# Patient Record
Sex: FEMALE | Race: BLACK OR AFRICAN AMERICAN | NOT HISPANIC OR LATINO | Employment: OTHER | URBAN - METROPOLITAN AREA
[De-identification: names, ages, dates, MRNs, and addresses within clinical notes are randomized per-mention and may not be internally consistent; named-entity substitution may affect disease eponyms.]

---

## 2023-02-17 DIAGNOSIS — I71.23 ANEURYSM OF DESCENDING THORACIC AORTA, UNSPECIFIED WHETHER RUPTURED: Primary | ICD-10-CM

## 2023-03-03 ENCOUNTER — HOSPITAL ENCOUNTER (OUTPATIENT)
Dept: RADIOLOGY | Facility: HOSPITAL | Age: 68
Discharge: HOME | End: 2023-03-03
Attending: SURGERY
Payer: MEDICARE

## 2023-03-03 DIAGNOSIS — I71.23 ANEURYSM OF DESCENDING THORACIC AORTA, UNSPECIFIED WHETHER RUPTURED: ICD-10-CM

## 2023-03-03 LAB
CREAT SERPL-MCNC: 1.1 MG/DL (ref 0.5–1.4)
SAMPLE: NORMAL

## 2023-03-03 PROCEDURE — 74174 CTA ABD&PLVS W/CONTRAST: CPT | Mod: TC,PO

## 2023-03-03 PROCEDURE — 25500020 PHARM REV CODE 255: Mod: PO | Performed by: SURGERY

## 2023-03-03 PROCEDURE — 71275 CT ANGIOGRAPHY CHEST: CPT | Mod: TC,PO

## 2023-03-03 RX ADMIN — IOHEXOL 100 ML: 350 INJECTION, SOLUTION INTRAVENOUS at 01:03

## 2024-04-15 ENCOUNTER — TELEPHONE (OUTPATIENT)
Dept: HEMATOLOGY/ONCOLOGY | Facility: CLINIC | Age: 69
End: 2024-04-15
Payer: MEDICARE

## 2024-04-15 NOTE — NURSING
Chart review. Pt scheduled via [facility] according to the appointment with Dr. Coe. No referral in the system, called the patient who states that someone from [facility] called her to schedule the appointment. The appointment states referral for Hemorrhoids which there is no referral, pt states it's for pancreatic insufficiency.  Messaged Dr. Coe' nurse Kylie for appropriate scheduling as Dr. Coe does not follow pancreatic insuffiencey, pt may need EUS. Will defer to Kylie for appropriate scheduling.

## 2024-04-16 ENCOUNTER — TELEPHONE (OUTPATIENT)
Dept: SURGERY | Facility: CLINIC | Age: 69
End: 2024-04-16
Payer: MEDICARE

## 2024-04-16 NOTE — TELEPHONE ENCOUNTER
Over the last year patient has had urinary incontinence and more recently fecal incontinence.  Has been tested for InterStim for urinary problems, but fecal incontinence was not issue at the time. InterStim did not correct urinary problem enough to warrant use per patient. October/November 2023 fecal incontinence began, so much so she had to change her clothes. Diet and medication change has helped to the point patient does not need to change clothes as often, but is still requiring the use of a pad.  States that she had Pelvic Floor Therapy (PFT) at [facility] in [place], [place] towards the end of March/early April, but it did not relieve the issue. A recent CT scan and stool sample showed pancreas is not breaking down the food as it should. Patient sees Dr Hallman (urology) and NP is Mis Badillo. Will discuss with Dr Coe' nurse to see if Dr Coe sees fecal incontinence and return call to patient tomorrow. Informed patient, that if Dr Coe does not, we will help get her scheduled with an appropriate provider. Will request PFT,CT and stool sample reports.

## 2024-04-17 ENCOUNTER — TELEPHONE (OUTPATIENT)
Dept: SURGERY | Facility: CLINIC | Age: 69
End: 2024-04-17
Payer: MEDICARE

## 2024-04-17 NOTE — TELEPHONE ENCOUNTER
Spoke with patient. Informed her that Dr Coe does not see fecal incontinence, discussed patient with Dr Dickinson's nurse. Patient to expect a call Tuesday for evaluation for appt needs. Patient verbalized  understanding.

## 2024-04-17 NOTE — TELEPHONE ENCOUNTER
----- Message from Celeste Phillip RN sent at 4/16/2024  1:01 PM CDT -----    ----- Message -----  From: Celeste Phillip RN  Sent: 4/16/2024   1:01 PM CDT  To: HOLLAND Sanchez Dr? Or Teena? Request records.

## 2024-04-17 NOTE — TELEPHONE ENCOUNTER
Faxed LALO to [facility]  in [facility], [place] fax: [phone]  ( [phone]; release office phone number is [phone]).

## 2024-04-23 ENCOUNTER — TELEPHONE (OUTPATIENT)
Dept: SURGERY | Facility: CLINIC | Age: 69
End: 2024-04-23
Payer: MEDICARE

## 2024-04-23 NOTE — TELEPHONE ENCOUNTER
"Placed call to schedule appt with [facility].  Dx: rUTIs, persistent UUI, fecal incontinence & urgency   Ref. Provider: Felipe Mena MD    InterStim testing performed 23 for fecal incontinence; Declined placement at that time - pt states "results weren't high enough to warrant implantation" to which she agreed with the MD at that time; however, fecal incontinence is significantly worse now since approx Oct- 2023. She states these symptoms impact her life on a daily basis.    GI recommended Manometry or Defo - pt denies/does not recall having these testing recommendations done.     PFPT- started in 2024-2024. Records from [facility] in [place], [place] w/ Candie, PT requested per Cat, CRS RN Navigator (See previous notes).    Takes Creon (TID w/ meals) & fiber daily w/ some benefit. Occasionally takes Imodium.   Feels she is unable to fully empty colon & continues to experience fecal smearing.    Additionally, pt has 3 children born; 1st two were , last via  section d/t fetal positioning.     Mrs. Bates states she is seeing Dr. Mena today and will request that her medical records be faxed to our department. Fax number ([phone]) was provided along with my direct work number for future use.    Explained Pelvic Floor Clinic & its benefits. Offered appt date & time in [facility] to which patient provided verbal approval. Appt was booked at the time of call.  Briefly reviewed location details. Will place appt slip reminder in mail. Pt also states she is active in the portal.   All questions were addressed & answered to the patient's satisfaction.    No additional needs were identified upon completion of call.   "

## 2024-04-29 ENCOUNTER — TELEPHONE (OUTPATIENT)
Dept: SURGERY | Facility: CLINIC | Age: 69
End: 2024-04-29
Payer: MEDICARE

## 2024-05-14 NOTE — PROGRESS NOTES
CRS Office Visit History and Physical    Referring Md:   Felipe Mena Md  [address] [place] [address]    SUBJECTIVE:     Chief Complaint: Fecal incontinence, diarrhea    History of Present Illness:  The patient is new patient to this practice.   Course is as follows:  Patient is a 69 y.o. female w/ pancreatic insufficiency presents with fecal incontinence.  Symptoms have been present for 9 months.  Follows w/ Dr Mena (Gastro, [place]). On Creon, which is helping with formed stool. Has not tried Colestipol.  Tried Imodium, helped for 1 week after the dose.  Had an Interstim trial 1 year ago for urine symptoms, did not get implant b/c of inadequate effect (this was prior to fecal incontinence starting)  Tried PFPT  Last Colonoscopy: July 2022 (Bx negative for microscopic colitis, 5mm adenoma x 2)    Prior abdominal surgery: Yes - cherry, abdominal mass resection, KARYN/BSO, sleeve gastrectomy  Prior pelvic or anorectal surgery: No  Family history of colon/rectal cancer: No  Family history of IBD: No  Steroid or other immunosuppression: No  Blood thinners: No  Current stool softeners or fiber supplement: Yes - Metamucil BID (+ nausea)  Active smoking: No  Prior deliveries: Yes - 3  complicated by tear: No    [place] (FI):  Leakage of solid stool:     Daily (4)  Leakage of liquid stool:   Daily (4)  Leakage of flatus:       Daily (4)  Wear a pad:     Daily (4)  Alter life:       Usually (3)         Total: 19    Altomare (ODS):  How long on toilet:   <5min (0)   How many times/day: 5-6 (3)    Digitation:   Never (0)   Laxatives:   Never (0)   Enemas:    Never (0)   Incomplete stool: Weekly (2)   Strain:    Never (0)   Total: 5    Stool consistency/[place]: 5  Bowel movements per month:  Daily   Leakage of urine: Yes   Trouble emptying your bladder: Yes   Feel something bulging through vagina? No        Review of patient's allergies indicates:   Allergen Reactions     "Latex, natural rubber        Past Medical History:   Diagnosis Date    Loose skin      Past Surgical History:   Procedure Laterality Date    ANKLE LIGAMENT RECONSTRUCTION Left     CHOLECYSTECTOMY      COLONOSCOPY  07/2022    HYSTERECTOMY      ROTATOR CUFF REPAIR Left     SLEEVE GASTROPLASTY      TUMOR REMOVAL      Abdomen (8lbs)     No family history on file.  Social History     Tobacco Use    Smoking status: Never    Smokeless tobacco: Never   Substance Use Topics    Alcohol use: Never        Review of Systems:  ROS    OBJECTIVE:     Vital Signs (Most Recent)  /67 (BP Location: Left arm, Patient Position: Sitting, BP Method: Large (Automatic))   Pulse (!) 54   Ht 5' 7" (1.702 m)   Wt 94.1 kg (207 lb 7.3 oz)   BMI 32.49 kg/m²     Physical Exam:  General: White female in no distress   Neuro: Alert and oriented x 4.  Moves all extremities.     HEENT: No icterus.  Trachea midline  Respiratory: Respirations are even and unlabored  Cardiac: Regular rate  Abdomen: Well-healed midline and Kocher incisions  Extremities: Warm dry and intact  Skin: No rashes     Patient was examined w/ Dr Rosas present.    Anorectal:   External exam: Perianal skin healthy, not able to appreciate anal wink, + posterior vaginal wall scar, + rectocele  Digital exam revealed decreased tone. No masses. Weak squeeze with absent movement in anterior midline, no levator tenderness, appropriate relaxation with Valsalva      ASSESSMENT/PLAN:     70yo F w/ fecal incontinence    Start Colestipol  She will ask PCP about alternative to Metformin  Bowel diaries x 2 weeks  Discussed Interstim vs DigniFI trial, she is interested in Interstim  RTC 4 weeks    Paty Dickinson MD  Staff Surgeon, Colon and Rectal Surgery  [facility]    "

## 2024-05-14 NOTE — PROGRESS NOTES
[facility] 4TH FLOOR  [address]  [facility] [address]    Leyla Bates  [id number]  1955  May 15, 2024    Consulting Physician: Felipe Mena MD  (GI)  GYN: none; sees someone for compounded HRT  Primary M.D.: Denise Núñez, MONICAP-C  URO: Isaias ([place], [place])    Chief Complaint   Patient presents with    Fecal Incontinence    Urinary Urgency    Urinary Incontinence       HPI:     1)  UI:  (+) GASTON < (+) UUI  X years.  (+) pads:3/day, usually severe wetness--both stool and urine--and 1/night usually minimum wetness--mostly stool.  Daytime frequency: Q 2-3 hours.  Nocturia: No.   (--) dysuria,  (--) hematuria,  (--) frequent UTIs. E2 cream has resolved these.  (+) complete bladder emptying. Was told not emptying well per URO.   --sees URO for UI--was told has incomplete bladder emptying  --taking tolterodine 1 mg--started years; no previous meds  --has stopped caffeine  --did PT 12/2023-3/2024--was difficult to really do due to diarrhea  --had trial of SNS--didn't help enough so was removed  --had sling years ago (1980s)  --doesn't sound like had UDS recently    2)  POP:  Absent.  (--) vaginal bleeding. (--) vaginal discharge. (+) sexually active.  (--) dyspareunia.  (--)  Vaginal dryness.  (+) vaginal estrogen use. Uses 2x/week.  Also takes oral mya compounded HRT.    3)  BM:  (--) constipation/straining.  (+) chronic diarrhea. (--) hematochezia.  (+) fecal incontinence. Better on creatine.  (+) fecal smearing/urgency.  (--) complete evacuation.  Unless has diarrhea.  No s/sx rectal prolapse.   --4/2024 GI note:  follow up EPI and fecal incontinence.  She was diagnosed with pancreatic insufficiency in setting of low fecal elastase <50. Also biopsies on colonoscopy negative for microscopic colitis. She has been taking creon which helps her diarrhea. She is very satisfied with this medication. Unfortunately, she continues to have fecal incontinence and has accidents in  public. She has been following with physical therapy on pelvic floor therapy and reports that she has noticed increased strength in muscles, but again still not total improvement in fecal incontinence. For the last month, she has been taking metamucil daily and this has not helped as well. She is asking for referral to tertiary center for help for this problem.  Denies blood in stool.   Denies fever, nausea, abd pain, wt loss, constipation, melena, hematochezia, dysphagia, GERD, chest pain, or NSAID use. She tried imodium, but this caused constipation.  Denies blood in stool.     Past Medical History      Arthritis     Back pain     Colon polyps denies   CPAP (continuous positive airway pressure) dependence     Diverticulosis denies   HLD (hyperlipidemia) 1990's   HTN (hypertension), benign 1996   Insomnia ?   Neck pain     OAB (overactive bladder) 1980s   Sleep apnea     Type 2 diabetes mellitus (HCC Code)     --NIDDM; BLE neuropathy--takes gabapentin; no secondary disease; A1c 2024: 6.9%  Lab Results   Component Value Date    HGBA1C 6.7 (H) 04/18/2022       Past Medical History:   Diagnosis Date    Loose skin         Past Surgical History  Past Surgical History:   Procedure Laterality Date    ANKLE LIGAMENT RECONSTRUCTION Left     CHOLECYSTECTOMY      COLONOSCOPY  07/2022    HYSTERECTOMY      ROTATOR CUFF REPAIR Left     SLEEVE GASTROPLASTY      TUMOR REMOVAL      Abdomen (8lbs)       BLADDER SURGERY   1981    CHOLECYSTECTOMY        COLONOSCOPY N/A 7/25/2022     Procedure: COLONOSCOPY;  Surgeon: Felipe Mena MD;  Location: [facility] ENDOSCOPY [facility];  Service: Gastroenterology;  Laterality: N/A;  CPT 99633    COLONOSCOPY W/ BIOPSIES AND POLYPECTOMY   denies     12/6/17    EYE SURGERY   11/30/2021, 12/07/2021    gallstones   1995    gastric sleeve   10/31/2018    HYSTERECTOMY   1991    INTERSTIM TESTING N/A 2/28/2023     Procedure: INTERSTIM TESTING/MEDTRONIC ALFREDO;  Surgeon: Kaitlen Sicard Schexnayder, MD;   Location: [facility] UROLOGY [facility];  Service: Urology;  Laterality: N/A;    LIPECTOMY N/A 2021     Procedure: PANNICULECTOMY;  Surgeon: Jd Brady MD;  Location: [facility] LAW [facility];  Service: Plastics;  Laterality: N/A;    OTHER SURGICAL HISTORY N/A 10/03/2019     Procedure: CYSTO/DILATION/RETROGRADE PYELOGRAM;  Surgeon: Kaitlen Sicard Schexnayder, MD;  Location: [facility] UROLOGY [facility];  Service: Urology;  Laterality: N/A;  82347, [id number] / latex allergy / date and time per dr esparza   Xlap/vert 8 lbs benign tumor of abdomen removed without organ involvement--not CA  LSC cholecystectomy with follow up for repeat open RUQ cholecystectomy due to stone extraction not ammennable to ERCP  Abdominoplasty  LSC gastric sleeve  ([place], lost 145 lbs)   had sling (mesh) years ago ()  C/s x 1    Hysterectomy: Yes   Date: .  Indication: menorrhagia.    Type: xlap/Pfannenstiel  Cervix present: No  Ovaries present: No (removed with 2nd surgery)  Other procedures at time of hysterectomy:  none    Past Ob History     x 2.  C/s x 1 (breech).    Largest infant weight: 8#7oz  yes FAVD. yes episiotomy.      Gynecologic History  LMP: No LMP recorded.  Age of menarche: 10 yo  Age of menopause: with KARYN  Menstrual history: menorrhagia  Pap test: post hyst.  History of abnormal paps: No.  History of STIs:  No  Mammogram: Date of last: 2023.  Result: Normal  Colonoscopy: Date of last: .  Result:  polyps.  Repeat due:  /per GI.    DEXA:  Date of last: 2023.  Result:  normal.  Repeat due:  per PCP.     Family History  No family history on file.   Colon CA: No  Breast CA: No  GYN CA: No   CA: No    Social History  Social History     Tobacco Use   Smoking Status Never   Smokeless Tobacco Never     Social History     Substance and Sexual Activity   Alcohol Use Never   .    Social History     Substance and Sexual Activity   Drug Use Not on file     The patient is .  Resides in [facility]  [address].  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     Allergies  Review of patient's allergies indicates:   Allergen Reactions    Latex, natural rubber        Medications  Current Outpatient Medications on File Prior to Visit   Medication Sig Dispense Refill    amLODIPine (NORVASC) 5 MG tablet Take 5 mg by mouth once daily.      ascorbic acid-collagen (COLLAGEN PLUS VITAMIN C) 125-740 mg Cap Take 3 mg by mouth once daily.      calcium carbonate (OS-NAYELY) 600 mg calcium (1,500 mg) Tab Take 600 mg by mouth once.      cetirizine (ZYRTEC) 10 MG tablet Take 10 mg by mouth once daily.      cholecalciferol, vitamin D3, (D3-2000) 50 mcg (2,000 unit) Cap capsule Take 4,000 Units by mouth once daily.      coenzyme Q10 100 mg capsule Take 100 mg by mouth once daily.      colestipoL (COLESTID) 1 gram Tab Take 1 tablet (1 g total) by mouth 2 (two) times daily. 180 tablet 3    dextromethorphan-guaiFENesin  mg/5 ml (ROBITUSSIN-DM)  mg/5 mL liquid Take 5 mLs by mouth every 12 (twelve) hours.      dicyclomine (BENTYL) 10 MG capsule Take 10 mg by mouth 2 (two) times daily.      estradioL (VIVELLE-DOT) 0.1 mg/24 hr PTSW Place 1 patch onto the skin twice a week.      ferrous gluconate (FERGON) 324 MG tablet Take 324 mg by mouth daily with breakfast.      gabapentin (NEURONTIN) 300 MG capsule Take 300 mg by mouth every evening.      glimepiride (AMARYL) 4 MG tablet Take 4 mg by mouth before breakfast.      Lactobacillus rhamnosus GG (CULTURELLE) 10 billion cell capsule Take 1 capsule by mouth once daily.      lisinopriL 10 MG tablet Take 10 mg by mouth once daily.      metFORMIN (GLUCOPHAGE) 500 MG tablet Take 500 mg by mouth 2 (two) times daily with meals.      multivitamin (THERAGRAN) per tablet Take 1 tablet by mouth once daily.      pravastatin (PRAVACHOL) 40 MG tablet Take 40 mg by mouth once daily.      SITagliptin phosphate (JANUVIA) 100 MG Tab Take 100 mg by mouth once  daily.      temazepam (RESTORIL) 15 mg Cap Take 15 mg by mouth nightly as needed.      tolterodine (DETROL LA) 2 MG Cp24 Take 4 mg by mouth once daily.      zinc gluconate 50 mg tablet Take 50 mg by mouth once daily.       No current facility-administered medications on file prior to visit.       Review of Systems A 14 point ROS was reviewed with pertinent positives as noted above in the history of present illness.      Constitutional: negative  Eyes: negative  Endocrine: negative  Gastrointestinal: negative  Cardiovascular: negative  Respiratory: negative  Allergic/Immunologic: negative  Integumentary: negative  Psychiatric: negative  Musculoskeletal: negative   Ear/Nose/Throat: negative  Neurologic: negative  Genitourinary: SEE HPI  Hematologic/Lymphatic: negative   Breast: negative    Urogynecologic Exam  /67   Wt 94.1 kg (207 lb 7.3 oz)   BMI 32.49 kg/m²     GENERAL APPEARANCE:  The patient is well-developed, well-nourished.   Neck:  Supple with no thyromegaly, no carotid bruits.  Heart:  Regular rate and rhythm, no murmurs, rubs or gallops.  Lungs:  Clear.  No CVA tenderness.  Abdomen:  Soft, nontender, nondistended, no hepatosplenomegaly.  Incisions:  Abd plasty, vert midline, RUQ well-healed    PELVIC:    External genitalia:  Normal Bartholins, Skenes and labia bilaterally.    Urethra:  No caruncle, diverticulum or masses.  (+) hypermobility.    Vagina:  Atrophy (+) , no bladder masses or tender, no discharge.    Cervix:  absent  Uterus: uterus absent  Adnexa: Not palpable.    POP-Q:  Aa -2; Ba -2; C -9; Ap 0; Bp 0 (moderate).  Genital hiatus 4, perineal body 3, total vaginal length 10-11.    NEUROLOGIC:  Cranial nerves 2 through 12 intact.  Strength 5/5.  DTRs 2+ lower extremities.  S2 through 4 normal.  Sacral reflexes intact.    EXT: ESCALONA, 2+ pulses bilaterally, no C/C/E    COUGH STRESS TEST:  negative  KEGEL: 1 /5    RECTAL:    External:  Normal, (--) hemorrhoids, (--) dovetailing.   Internal:    per CRS; +rectocele    PVR: 50 mL    Impression    1. Urinary incontinence, mixed    2. Incontinence of feces, unspecified fecal incontinence type    3. Vaginal atrophy    4. Rectocele, female        Initial Plan  The patient was counseled regarding these issues. The patient was given a summary sheet containing each of these issues with possible options for evaluation and management. When appropriate, we also reviewed computer-generated diagrams specific to their diagnoses..  All questions were addressed to the patient's satisfaction.    1)  Mixed urinary incontinence, urge > stress:    --urine C&S  --review with your local urologist  --Empty bladder every 3 hours.  Empty well: wait a minute, lean forward on toilet.    --Avoid dietary irritants (see sheet).  Keep diary x 3-5 days to determine your irritants.  --restart pelvic floor PT.  Call to make appt:  Genesis Soriano DPT  [facility]   [address], [address]  [place] [address]  [employer]  Phone: [phone]  Fax Number: [phone]  Email: [email or web address]  Office Hours  Monday - Thursday: 7AM - 6PM  Friday: 7AM - 5PM  Saturday: Appointment Only  Sunday: Closed    --URGE: continue tolterodine.  Could change med to another anticholingeric or B3 agonist. Takes 2-4 weeks to see if will have effect.  For dry mouth: get sour, sugar free lozenge or gum.    --STRESS:  Pessary vs. Sling vs periurethral bulking.    --s/p sling in past   --would need urodynamics to see if stress leakage is more contributory    2)  Fecal incontinence:  --continue pancreatic enzyme  --consider bile binder  --discussed stem cell trial  --possible trial of SNS  --discuss changing from metformin per PCP    3) Vaginal atrophy:  --continue vaginal estrogen  --can help prevent UTIs and with urinary urgency/frequency    4)  h/o incomplete bladder emptying:  --PVR today: 50 mL  --consider changing from tolterodine to B3 agonist--discuss with URO    5)   Rectocele:  --no symptomatic  --CTM for now     6)  Follow up with CRS/Paruch.  Follow up with Urogyn if needed.  See URO in [place] to follow up for urinary incontinence.     Approximately 60 min were spent in consult, 90 % in discussion.     Thank you for requesting consultation of your patient.  I look forward to participating in their care.    India Rosas  Female Pelvic Medicine and Reconstructive Surgery  [facility] [place], [place]

## 2024-05-15 ENCOUNTER — OFFICE VISIT (OUTPATIENT)
Dept: UROGYNECOLOGY | Facility: CLINIC | Age: 69
End: 2024-05-15
Payer: MEDICARE

## 2024-05-15 ENCOUNTER — OFFICE VISIT (OUTPATIENT)
Dept: SURGERY | Facility: CLINIC | Age: 69
End: 2024-05-15
Attending: COLON & RECTAL SURGERY
Payer: MEDICARE

## 2024-05-15 VITALS
BODY MASS INDEX: 32.49 KG/M2 | WEIGHT: 207.44 LBS | SYSTOLIC BLOOD PRESSURE: 130 MMHG | HEIGHT: 67 IN | DIASTOLIC BLOOD PRESSURE: 67 MMHG | DIASTOLIC BLOOD PRESSURE: 67 MMHG | WEIGHT: 207.44 LBS | SYSTOLIC BLOOD PRESSURE: 130 MMHG | BODY MASS INDEX: 32.56 KG/M2 | HEART RATE: 54 BPM

## 2024-05-15 DIAGNOSIS — R15.9 INCONTINENCE OF FECES, UNSPECIFIED FECAL INCONTINENCE TYPE: ICD-10-CM

## 2024-05-15 DIAGNOSIS — N81.6 RECTOCELE, FEMALE: ICD-10-CM

## 2024-05-15 DIAGNOSIS — N95.2 VAGINAL ATROPHY: ICD-10-CM

## 2024-05-15 DIAGNOSIS — N39.46 URINARY INCONTINENCE, MIXED: Primary | ICD-10-CM

## 2024-05-15 DIAGNOSIS — R15.9 INCONTINENCE OF FECES, UNSPECIFIED FECAL INCONTINENCE TYPE: Primary | ICD-10-CM

## 2024-05-15 PROCEDURE — 51701 INSERT BLADDER CATHETER: CPT | Mod: S$PBB,,, | Performed by: OBSTETRICS & GYNECOLOGY

## 2024-05-15 PROCEDURE — 99205 OFFICE O/P NEW HI 60 MIN: CPT | Mod: S$PBB,25,, | Performed by: OBSTETRICS & GYNECOLOGY

## 2024-05-15 PROCEDURE — 99999 PR PBB SHADOW E&M-EST. PATIENT-LVL IV: CPT | Mod: PBBFAC,,, | Performed by: COLON & RECTAL SURGERY

## 2024-05-15 PROCEDURE — 99214 OFFICE O/P EST MOD 30 MIN: CPT | Mod: PBBFAC,25 | Performed by: COLON & RECTAL SURGERY

## 2024-05-15 PROCEDURE — 99205 OFFICE O/P NEW HI 60 MIN: CPT | Mod: S$PBB,,, | Performed by: COLON & RECTAL SURGERY

## 2024-05-15 PROCEDURE — 87086 URINE CULTURE/COLONY COUNT: CPT | Performed by: OBSTETRICS & GYNECOLOGY

## 2024-05-15 PROCEDURE — 99999 PR PBB SHADOW E&M-EST. PATIENT-LVL III: CPT | Mod: PBBFAC,,, | Performed by: OBSTETRICS & GYNECOLOGY

## 2024-05-15 PROCEDURE — 51701 INSERT BLADDER CATHETER: CPT | Mod: PBBFAC | Performed by: OBSTETRICS & GYNECOLOGY

## 2024-05-15 PROCEDURE — 99213 OFFICE O/P EST LOW 20 MIN: CPT | Mod: PBBFAC,27 | Performed by: OBSTETRICS & GYNECOLOGY

## 2024-05-15 RX ORDER — GLIMEPIRIDE 4 MG/1
4 TABLET ORAL
COMMUNITY

## 2024-05-15 RX ORDER — ACETAMINOPHEN 500 MG
4000 TABLET ORAL DAILY
COMMUNITY

## 2024-05-15 RX ORDER — MONTELUKAST SODIUM 4 MG/1
1 TABLET, CHEWABLE ORAL 2 TIMES DAILY
Qty: 180 TABLET | Refills: 3 | Status: SHIPPED | OUTPATIENT
Start: 2024-05-15 | End: 2024-06-18

## 2024-05-15 RX ORDER — ZINC GLUCONATE 50 MG
50 TABLET ORAL DAILY
COMMUNITY

## 2024-05-15 RX ORDER — AMLODIPINE BESYLATE 5 MG/1
5 TABLET ORAL DAILY
COMMUNITY

## 2024-05-15 RX ORDER — ESTRADIOL 0.1 MG/D
1 FILM, EXTENDED RELEASE TRANSDERMAL
COMMUNITY

## 2024-05-15 RX ORDER — PRAVASTATIN SODIUM 40 MG/1
40 TABLET ORAL DAILY
COMMUNITY

## 2024-05-15 RX ORDER — GABAPENTIN 300 MG/1
300 CAPSULE ORAL NIGHTLY
COMMUNITY

## 2024-05-15 RX ORDER — METFORMIN HYDROCHLORIDE 500 MG/1
500 TABLET ORAL 2 TIMES DAILY WITH MEALS
COMMUNITY

## 2024-05-15 RX ORDER — TOLTERODINE 2 MG/1
4 CAPSULE, EXTENDED RELEASE ORAL DAILY
COMMUNITY

## 2024-05-15 RX ORDER — GUAIFENESIN AND DEXTROMETHORPHAN HYDROBROMIDE 10; 100 MG/5ML; MG/5ML
5 SYRUP ORAL
COMMUNITY

## 2024-05-15 RX ORDER — METAPROTERENOL SULFATE 20 MG
3 TABLET ORAL DAILY
COMMUNITY

## 2024-05-15 RX ORDER — EPINEPHRINE 0.22MG
100 AEROSOL WITH ADAPTER (ML) INHALATION DAILY
COMMUNITY

## 2024-05-15 RX ORDER — DICYCLOMINE HYDROCHLORIDE 10 MG/1
10 CAPSULE ORAL 2 TIMES DAILY
COMMUNITY

## 2024-05-15 RX ORDER — FERROUS GLUCONATE 324(38)MG
324 TABLET ORAL
COMMUNITY

## 2024-05-15 RX ORDER — LISINOPRIL 10 MG/1
10 TABLET ORAL DAILY
COMMUNITY

## 2024-05-15 RX ORDER — CETIRIZINE HYDROCHLORIDE 10 MG/1
10 TABLET ORAL DAILY
COMMUNITY

## 2024-05-15 RX ORDER — TEMAZEPAM 15 MG/1
15 CAPSULE ORAL NIGHTLY PRN
COMMUNITY

## 2024-05-15 RX ORDER — MULTIVITAMIN
1 TABLET ORAL DAILY
COMMUNITY

## 2024-05-15 RX ORDER — CALCIUM CARBONATE 600 MG
600 TABLET ORAL ONCE
COMMUNITY

## 2024-05-15 NOTE — PATIENT INSTRUCTIONS
Bladder Irritants  Certain foods and drinks have been associated with worsening symptoms of urinary frequency, urgency, urge incontinence, or bladder pain. If you suffer from any of these conditions, you may wish to try eliminating one or more of these foods from your diet and see if your symptoms improve. If bladder symptoms are related to dietary factors, strict adherence to a diet thateliminates the food should bring marked relief in 10 days. Once you are feeling better, you can begin to add foods back into your diet, one at a time. If symptoms return, you will be able to identify the irritant. As you add foods back to your diet it is very important that you drink significant amounts of water.    -----------------------------------------------------------------------------------------------  List of Common Bladder Irritants*  Alcoholic beverages  Apples and apple juice  Cantaloupe  Carbonated beverages  Chili and spicy foods  Chocolate  Citrus fruit  Coffee (including decaffeinated)  Cranberries and cranberry juice  Grapes  Guava  Milk Products: milk, cheese, cottage cheese, yogurt, ice cream  Peaches  Pineapple  Plums  Strawberries  Sugar especially artificial sweeteners, saccharin, aspartame, corn sweeteners, honey, fructose, sucrose, lactose  Tea  Tomatoes and tomato juice  Vitamin B complex  Vinegar  *Most people are not sensitive to ALL of these products; your goal is to find the foods that make YOUR symptoms worse.  ---------------------------------------------------------------------------------------------------    Low-acid fruit substitutions include apricots, papaya, pears and watermelon. Coffee drinkers can drink Kava or other lowacid instant drinks. Tea drinkers can substitute non-citrus herbal and sun brewed teas. Calcium carbonate co-buffered with calcium ascorbate can be substituted for Vitamin C. Prelief is a dietary supplement that works as an acid blocker for the bladder.    Where to get more  information:        Overcoming Bladder Disorders by Tessa North and Shireen Munoz, 1990        You Dont Have to Live with Cystitis! By Kirstin Manning, 1988  http://www.urologymanagement.org/oab  ---------------------------------------------------------    1)  Mixed urinary incontinence, urge > stress:    --urine C&S  --review with your local urologist  --Empty bladder every 3 hours.  Empty well: wait a minute, lean forward on toilet.    --Avoid dietary irritants (see sheet).  Keep diary x 3-5 days to determine your irritants.  --restart pelvic floor PT.  Call to make appt:  Genesis Soriano DPT  [facility]   [address], [address]  [place] [address]  [employer]  Phone: [phone]  Fax Number: [phone]  Email: [email or web address]  Office Hours  Monday - Thursday: 7AM - 6PM  Friday: 7AM - 5PM  Saturday: Appointment Only  Sunday: Closed    --URGE: continue tolterodine.  Could change med to another anticholingeric or B3 agonist. Takes 2-4 weeks to see if will have effect.  For dry mouth: get sour, sugar free lozenge or gum.    --STRESS:  Pessary vs. Sling vs periurethral bulking.    --s/p sling in past   --would need urodynamics to see if stress leakage is more contributory    2)  Fecal incontinence:  --continue pancreatic enzyme  --consider bile binder  --discussed stem cell trial  --possible trial of SNS  --discuss changing from metformin per PCP    3) Vaginal atrophy:  --continue vaginal estrogen  --can help prevent UTIs and with urinary urgency/frequency    4)  h/o incomplete bladder emptying:  --PVR today: 50 mL  --consider changing from tolterodine to B3 agonist--discuss with URO    5)  Rectocele:  --no symptomatic  --CTM for now     6)  Follow up with CRS/Paruch.  Follow up with Urogyn if needed.  See URO in [place] to follow up for urinary incontinence.

## 2024-05-16 LAB — BACTERIA UR CULT: NO GROWTH

## 2024-05-17 ENCOUNTER — PATIENT MESSAGE (OUTPATIENT)
Dept: UROGYNECOLOGY | Facility: CLINIC | Age: 69
End: 2024-05-17
Payer: MEDICARE

## 2024-05-17 NOTE — TELEPHONE ENCOUNTER
Patient having burning/pain with urination.   No N/V/F/C or other s/sx pyelo.   Advised to call local PCP or URO so can check C&S before they start her on ABX.  She will do so.

## 2024-06-05 ENCOUNTER — PATIENT MESSAGE (OUTPATIENT)
Dept: SURGERY | Facility: CLINIC | Age: 69
End: 2024-06-05
Payer: MEDICARE

## 2024-06-18 RX ORDER — MONTELUKAST SODIUM 4 MG/1
1 TABLET, CHEWABLE ORAL DAILY
Qty: 30 TABLET | Refills: 6 | Status: SHIPPED | OUTPATIENT
Start: 2024-06-18

## 2024-06-24 NOTE — PROGRESS NOTES
CRS Office Visit History and Physical    Referring Md:   Paty Dickinson Md  [id number] [place] [address]    SUBJECTIVE:     Chief Complaint: Fecal incontinence, diarrhea    History of Present Illness:  Patient is a 69 y.o. female w/ pancreatic insufficiency presents and fecal incontinence presents for follow-up.  Some improvement with Colestipol, just went up to 2 tabs/day. Stool is still [place] 6/7 most of the time.  Has not yet seen her PCP to switch off of Metformin.  Bowel diaries scanned into Media.    (5/15/2024)  Symptoms have been present for 9 months.  Follows w/ Dr Mena ([facility], [place]). On Creon, which is helping with formed stool. Has not tried Colestipol.  Tried Imodium, helped for 1 week after the dose.  Had an Interstim trial 1 year ago for urine symptoms, did not get implant b/c of inadequate effect (this was prior to fecal incontinence starting)  Tried PFPT  Last Colonoscopy: July 2022 (Bx negative for microscopic colitis, 5mm adenoma x 2)    Prior abdominal surgery: Yes - cherry, abdominal mass resection, KARYN/BSO, sleeve gastrectomy  Prior pelvic or anorectal surgery: No  Family history of colon/rectal cancer: No  Family history of IBD: No  Steroid or other immunosuppression: No  Blood thinners: No  Current stool softeners or fiber supplement: Yes - Metamucil BID (+ nausea)  Active smoking: No  Prior deliveries: Yes - 3  complicated by tear: No    [place] (FI):  Leakage of solid stool:     Daily (4)  Leakage of liquid stool:   Daily (4)  Leakage of flatus:       Daily (4)  Wear a pad:     Daily (4)  Alter life:       Usually (3)         Total: 19    Altomare (ODS):  How long on toilet:   <5min (0)   How many times/day: 5-6 (3)    Digitation:   Never (0)   Laxatives:   Never (0)   Enemas:    Never (0)   Incomplete stool: Weekly (2)   Strain:    Never (0)   Total: 5    Stool consistency/[place]: 5  Bowel movements per month:  Daily   Leakage of urine: Yes   Trouble emptying  "your bladder: Yes   Feel something bulging through vagina? No        Review of patient's allergies indicates:   Allergen Reactions    Latex, natural rubber        Past Medical History:   Diagnosis Date    Loose skin      Past Surgical History:   Procedure Laterality Date    ANKLE LIGAMENT RECONSTRUCTION Left     CHOLECYSTECTOMY      COLONOSCOPY  07/2022    HYSTERECTOMY      ROTATOR CUFF REPAIR Left     SLEEVE GASTROPLASTY      TUMOR REMOVAL      Abdomen (8lbs)     No family history on file.  Social History     Tobacco Use    Smoking status: Never    Smokeless tobacco: Never   Substance Use Topics    Alcohol use: Never        Review of Systems:  ROS    OBJECTIVE:     Vital Signs (Most Recent)  BP (!) 142/65 (BP Location: Left arm, Patient Position: Sitting, BP Method: Medium (Automatic))   Pulse (!) 50   Ht 5' 7" (1.702 m)   Wt 93.1 kg (205 lb 4 oz)   BMI 32.15 kg/m²     Physical Exam:  General: White female in no distress   Neuro: Alert and oriented x 4.  Moves all extremities.     HEENT: No icterus.  Trachea midline  Respiratory: Respirations are even and unlabored  Cardiac: Regular rate  Abdomen: Well-healed midline and [place] incisions  Extremities: Warm dry and intact  Skin: No rashes      ASSESSMENT/PLAN:     70yo F w/ fecal incontinence    Increase Colestipol (can do at least 2 tabs TID)  She will ask PCP about alternative to Metformin  Likely Interstim if not improved   Virtual visit in 4-6 weeks    Paty Dickinson MD  Staff Surgeon, Colon and Rectal Surgery  [facility]      "

## 2024-06-25 ENCOUNTER — TELEPHONE (OUTPATIENT)
Dept: SURGERY | Facility: CLINIC | Age: 69
End: 2024-06-25
Payer: MEDICARE

## 2024-06-26 ENCOUNTER — OFFICE VISIT (OUTPATIENT)
Dept: SURGERY | Facility: CLINIC | Age: 69
End: 2024-06-26
Attending: COLON & RECTAL SURGERY
Payer: MEDICARE

## 2024-06-26 VITALS
WEIGHT: 205.25 LBS | HEART RATE: 50 BPM | SYSTOLIC BLOOD PRESSURE: 142 MMHG | HEIGHT: 67 IN | BODY MASS INDEX: 32.21 KG/M2 | DIASTOLIC BLOOD PRESSURE: 65 MMHG

## 2024-06-26 DIAGNOSIS — R15.9 INCONTINENCE OF FECES, UNSPECIFIED FECAL INCONTINENCE TYPE: Primary | ICD-10-CM

## 2024-06-26 PROCEDURE — 99214 OFFICE O/P EST MOD 30 MIN: CPT | Mod: PBBFAC | Performed by: COLON & RECTAL SURGERY

## 2024-06-26 PROCEDURE — 99215 OFFICE O/P EST HI 40 MIN: CPT | Mod: S$PBB,,, | Performed by: COLON & RECTAL SURGERY

## 2024-06-26 PROCEDURE — 99999 PR PBB SHADOW E&M-EST. PATIENT-LVL IV: CPT | Mod: PBBFAC,,, | Performed by: COLON & RECTAL SURGERY

## 2024-06-26 PROCEDURE — 87081 CULTURE SCREEN ONLY: CPT | Performed by: COLON & RECTAL SURGERY

## 2024-06-28 ENCOUNTER — PATIENT MESSAGE (OUTPATIENT)
Dept: SURGERY | Facility: CLINIC | Age: 69
End: 2024-06-28
Payer: MEDICARE

## 2024-06-29 LAB — MRSA SPEC QL CULT: NORMAL

## 2024-07-09 ENCOUNTER — PATIENT MESSAGE (OUTPATIENT)
Dept: SURGERY | Facility: CLINIC | Age: 69
End: 2024-07-09
Payer: MEDICARE

## 2024-07-09 RX ORDER — MONTELUKAST SODIUM 4 MG/1
2 TABLET, CHEWABLE ORAL 3 TIMES DAILY
Qty: 180 TABLET | Refills: 3 | Status: SHIPPED | OUTPATIENT
Start: 2024-07-09 | End: 2024-11-06

## 2024-07-17 ENCOUNTER — PATIENT MESSAGE (OUTPATIENT)
Dept: SURGERY | Facility: CLINIC | Age: 69
End: 2024-07-17
Payer: MEDICARE

## 2024-07-18 NOTE — PROGRESS NOTES
CRS Office Visit History and Physical    The patient location is: Car  The chief complaint leading to consultation is: Fecal incontinence, diarrhea    Visit type: audiovisual    Face to Face time with patient: 6min  10 minutes of total time spent on the encounter, which includes face to face time and non-face to face time preparing to see the patient (eg, review of tests), Obtaining and/or reviewing separately obtained history, Documenting clinical information in the electronic or other health record, Independently interpreting results (not separately reported) and communicating results to the patient/family/caregiver, or Care coordination (not separately reported).       Each patient to whom he or she provides medical services by telemedicine is:  (1) informed of the relationship between the physician and patient and the respective role of any other health care provider with respect to management of the patient; and (2) notified that he or she may decline to receive medical services by telemedicine and may withdraw from such care at any time.      SUBJECTIVE:     Chief Complaint: Fecal incontinence, diarrhea    History of Present Illness:  Patient is a 69 y.o. female w/ pancreatic insufficiency presents and fecal incontinence presents for follow-up.  Doing much better. Stopped Metformin on 6/26. Taking Colestipol TID.  Having a daily BM.   Getting an A1C, to see if she needs another class.  Has been off of coffee.    (6/26/2024)  Some improvement with Colestipol, just went up to 2 tabs/day. Stool is still [place] 6/7 most of the time.  Has not yet seen her PCP to switch off of Metformin.  Bowel diaries scanned into Media.    (5/15/2024)  Symptoms have been present for 9 months.  Follows w/ Dr Mena ([facility], [place]). On Creon, which is helping with formed stool. Has not tried Colestipol.  Tried Imodium, helped for 1 week after the dose.  Had an Interstim trial 1 year ago for urine symptoms, did not get implant  b/c of inadequate effect (this was prior to fecal incontinence starting)  Tried PFPT  Last Colonoscopy: July 2022 (Bx negative for microscopic colitis, 5mm adenoma x 2)    Prior abdominal surgery: Yes - cherry, abdominal mass resection, KARYN/BSO, sleeve gastrectomy  Prior pelvic or anorectal surgery: No  Family history of colon/rectal cancer: No  Family history of IBD: No  Steroid or other immunosuppression: No  Blood thinners: No  Current stool softeners or fiber supplement: Yes - Metamucil BID (+ nausea)  Active smoking: No  Prior deliveries: Yes - 3  complicated by tear: No    [place] (FI):  Leakage of solid stool:     Daily (4)  Leakage of liquid stool:   Daily (4)  Leakage of flatus:       Daily (4)  Wear a pad:     Daily (4)  Alter life:       Usually (3)         Total: 19    Altomare (ODS):  How long on toilet:   <5min (0)   How many times/day: 5-6 (3)    Digitation:   Never (0)   Laxatives:   Never (0)   Enemas:    Never (0)   Incomplete stool: Weekly (2)   Strain:    Never (0)   Total: 5    Stool consistency/[place]: 5  Bowel movements per month:  Daily   Leakage of urine: Yes   Trouble emptying your bladder: Yes   Feel something bulging through vagina? No        Review of patient's allergies indicates:   Allergen Reactions    Latex, natural rubber        Past Medical History:   Diagnosis Date    Loose skin      Past Surgical History:   Procedure Laterality Date    ANKLE LIGAMENT RECONSTRUCTION Left     CHOLECYSTECTOMY      COLONOSCOPY  07/2022    HYSTERECTOMY      ROTATOR CUFF REPAIR Left     SLEEVE GASTROPLASTY      TUMOR REMOVAL      Abdomen (8lbs)     No family history on file.  Social History     Tobacco Use    Smoking status: Never    Smokeless tobacco: Never   Substance Use Topics    Alcohol use: Never        Review of Systems:  ROS    OBJECTIVE:       Physical Exam:  General: White female in no distress   Neuro: Alert and oriented x 4.       ASSESSMENT/PLAN:     70yo F w/ fecal incontinence    Wean  down Colestipol   RTC prn    Paty Dickinson MD  Staff Surgeon, Colon and Rectal Surgery  [facility]

## 2024-07-19 ENCOUNTER — OFFICE VISIT (OUTPATIENT)
Dept: SURGERY | Facility: CLINIC | Age: 69
End: 2024-07-19
Attending: COLON & RECTAL SURGERY
Payer: MEDICARE

## 2024-07-19 DIAGNOSIS — R15.9 INCONTINENCE OF FECES, UNSPECIFIED FECAL INCONTINENCE TYPE: Primary | ICD-10-CM

## 2024-07-19 PROCEDURE — 99212 OFFICE O/P EST SF 10 MIN: CPT | Mod: 95,,, | Performed by: COLON & RECTAL SURGERY
